# Patient Record
Sex: FEMALE | Race: WHITE
[De-identification: names, ages, dates, MRNs, and addresses within clinical notes are randomized per-mention and may not be internally consistent; named-entity substitution may affect disease eponyms.]

---

## 2019-02-04 ENCOUNTER — HOSPITAL ENCOUNTER (EMERGENCY)
Dept: HOSPITAL 31 - C.ER | Age: 24
Discharge: HOME | End: 2019-02-04
Payer: COMMERCIAL

## 2019-02-04 VITALS
SYSTOLIC BLOOD PRESSURE: 118 MMHG | RESPIRATION RATE: 20 BRPM | DIASTOLIC BLOOD PRESSURE: 82 MMHG | TEMPERATURE: 98.9 F | OXYGEN SATURATION: 100 % | HEART RATE: 84 BPM

## 2019-02-04 DIAGNOSIS — R11.2: Primary | ICD-10-CM

## 2019-02-04 DIAGNOSIS — R19.7: ICD-10-CM

## 2019-02-04 LAB
ALBUMIN SERPL-MCNC: 5.2 G/DL (ref 3.5–5)
ALBUMIN/GLOB SERPL: 1.7 {RATIO} (ref 1–2.1)
ALT SERPL-CCNC: 21 U/L (ref 9–52)
AST SERPL-CCNC: 49 U/L (ref 14–36)
BACTERIA #/AREA URNS HPF: (no result) /[HPF]
BASOPHILS # BLD AUTO: 0 K/UL (ref 0–0.2)
BASOPHILS NFR BLD: 0.2 % (ref 0–2)
BILIRUB UR-MCNC: NEGATIVE MG/DL
BUN SERPL-MCNC: 10 MG/DL (ref 7–17)
CALCIUM SERPL-MCNC: 9.7 MG/DL (ref 8.6–10.4)
EOSINOPHIL # BLD AUTO: 0 K/UL (ref 0–0.7)
EOSINOPHIL NFR BLD: 0.1 % (ref 0–4)
ERYTHROCYTE [DISTWIDTH] IN BLOOD BY AUTOMATED COUNT: 12.9 % (ref 11.5–14.5)
GFR NON-AFRICAN AMERICAN: > 60
GLUCOSE UR STRIP-MCNC: NORMAL MG/DL
HCG,QUALITATIVE URINE: NEGATIVE
HGB BLD-MCNC: 12.5 G/DL (ref 11–16)
LEUKOCYTE ESTERASE UR-ACNC: (no result) LEU/UL
LIPASE: 75 U/L (ref 23–300)
LYMPHOCYTES # BLD AUTO: 0.9 K/UL (ref 1–4.3)
LYMPHOCYTES NFR BLD AUTO: 5.6 % (ref 20–40)
MCH RBC QN AUTO: 31.3 PG (ref 27–31)
MCHC RBC AUTO-ENTMCNC: 33.9 G/DL (ref 33–37)
MCV RBC AUTO: 92.3 FL (ref 81–99)
MONOCYTES # BLD: 0.3 K/UL (ref 0–0.8)
MONOCYTES NFR BLD: 2 % (ref 0–10)
NEUTROPHILS # BLD: 14.5 K/UL (ref 1.8–7)
NEUTROPHILS NFR BLD AUTO: 92.1 % (ref 50–75)
NRBC BLD AUTO-RTO: 0 % (ref 0–2)
PH UR STRIP: 6 [PH] (ref 5–8)
PLATELET # BLD: 247 K/UL (ref 130–400)
PMV BLD AUTO: 8.5 FL (ref 7.2–11.7)
PROT UR STRIP-MCNC: (no result) MG/DL
RBC # BLD AUTO: 3.98 MIL/UL (ref 3.8–5.2)
RBC # UR STRIP: NEGATIVE /UL
SP GR UR STRIP: 1.03 (ref 1–1.03)
SQUAMOUS EPITHIAL: 1 /HPF (ref 0–5)
UROBILINOGEN UR-MCNC: NORMAL MG/DL (ref 0.2–1)
WBC # BLD AUTO: 15.8 K/UL (ref 4.8–10.8)

## 2019-02-04 PROCEDURE — 80053 COMPREHEN METABOLIC PANEL: CPT

## 2019-02-04 PROCEDURE — 84702 CHORIONIC GONADOTROPIN TEST: CPT

## 2019-02-04 PROCEDURE — 83690 ASSAY OF LIPASE: CPT

## 2019-02-04 PROCEDURE — 84703 CHORIONIC GONADOTROPIN ASSAY: CPT

## 2019-02-04 PROCEDURE — 99284 EMERGENCY DEPT VISIT MOD MDM: CPT

## 2019-02-04 PROCEDURE — 85025 COMPLETE CBC W/AUTO DIFF WBC: CPT

## 2019-02-04 PROCEDURE — 81001 URINALYSIS AUTO W/SCOPE: CPT

## 2019-02-04 PROCEDURE — 96375 TX/PRO/DX INJ NEW DRUG ADDON: CPT

## 2019-02-04 PROCEDURE — 96374 THER/PROPH/DIAG INJ IV PUSH: CPT

## 2019-02-04 NOTE — C.PDOC
History Of Present Illness


23 year old female presents to the ER with a complaint of vomiting, nausea, and 

diarrhea typical of her menstrual periods. Patient states she has been vomiting 

since last night, not tolerating fluids. Denies fever or chills.


Time Seen by Provider: 02/04/19 21:56


Chief Complaint (Nursing): Abdominal Pain


History Per: Patient


History/Exam Limitations: no limitations


Onset/Duration Of Symptoms: Hrs


Current Symptoms Are (Timing): Still Present


Associated Symptoms: Nausea, Vomiting, Diarrhea.  denies: Fever, Chills


Exacerbating Factors: None


Alleviating Factors: None


Recent travel outside of the [place]: No





Past Medical History


Reviewed: Historical Data, Nursing Documentation, Vital Signs


Vital Signs: 





                                Last Vital Signs











Temp  98.9 F   02/04/19 21:40


 


Pulse  84   02/04/19 21:40


 


Resp  20   02/04/19 21:40


 


BP  118/82   02/04/19 21:40


 


Pulse Ox  100   02/04/19 21:40











Family History: States: Unknown Family Hx





- Social History


Hx Alcohol Use: No


Hx Substance Use: No





- Immunization History


Hx Tetanus Toxoid Vaccination: No


Hx Influenza Vaccination: No


Hx Pneumococcal Vaccination: No





Review Of Systems


Constitutional: Negative for: Fever, Chills


Cardiovascular: Negative for: Chest Pain, Palpitations


Respiratory: Negative for: Cough, Shortness of Breath


Gastrointestinal: Positive for: Nausea, Vomiting, Diarrhea


Neurological: Negative for: Weakness, Numbness





Physical Exam





- Physical Exam


Appears: Non-toxic, Other (Thin white female, Vomiting)


Skin: Warm, Dry, Pale


Head: Atraumatic, Normacephalic


Eye(s): bilateral: Normal Inspection


Oral Mucosa: Moist


Neck: Normal, Supple


Chest: Symmetrical, No Tenderness


Cardiovascular: Rhythm Regular


Respiratory: Normal Breath Sounds, No Rales, No Rhonchi, No Wheezing


Gastrointestinal/Abdominal: Soft, No Tenderness


Back: No CVA Tenderness


Neurological/Psych: Oriented x3, Normal Speech





ED Course And Treatment





- Laboratory Results


Result Diagrams: 


                                 02/04/19 22:42





                                 02/04/19 22:42


Lab Results: 





                                        











Urine HCG, Qual  Negative  (NEGATIVE)   02/04/19  22:42    








                                        











Urine HCG, Qual  Negative  (NEGATIVE)   02/04/19  22:42    











Lab Interpretation: Abnormal (+ leukocytosis, prob due to vomiting)


Urine Pregnancy POC: Negative


O2 Sat by Pulse Oximetry: 100


Pulse Ox Interpretation: Normal


Reevaluation Time: 23:12


Reassessment Condition: Improved (mild residual nausea)





Medical Decision Making


Medical Decision Making: 





typical menstrual period n/v/d


improved with ED tx











Disposition


Doctor Will See Patient In The: Office


Counseled Patient/Family Regarding: Studies Performed, Diagnosis





- Disposition


Referrals: 


Axel Hill MD [Staff Provider] - 


Disposition: HOME/ ROUTINE


Disposition Time: 23:13


Condition: GOOD


Additional Instructions: 


zofran ODT 4 mg (nausea medication, dissolves in mouth) 


1 tab every 6-8 hours as needed for nausea/vomiting





Pepcid 20 mg twice a day (lowers stomach acid and settles stomach)


bland diet for 2 days


Prescriptions: 


Ondansetron ODT [Zofran ODT] 4 mg PO Q6H PRN #6 odt


 PRN Reason: Nausea/Vomiting


Instructions:  Diarrhea in Adolescents and Adults, Nausea and Vomiting, Adult 

(DC)


Forms:  [facility] (English)





- Clinical Impression


Clinical Impression: 


 Diarrhea, Vomiting








- Scribe Statement


The provider has reviewed the documentation as recorded by the Scribmina Hoff





All medical record entries made by the Scribe were at my direction and 

personally dictated by me. I have reviewed the chart and agree that the record 

accurately reflects my personal performance of the history, physical exam, 

medical decision making, and the department course for this patient. I have also

personally directed, reviewed, and agree with the discharge instructions and 

disposition.

## 2019-02-05 LAB
EOSINOPHIL NFR BLD: 2 % (ref 0–4)
LYMPHOCYTE: 3 % (ref 20–40)
MONOCYTE: 1 % (ref 0–10)
NEUTROPHILS NFR BLD AUTO: 94 % (ref 50–75)
PLATELET # BLD EST: NORMAL 10*3/UL
TOTAL CELLS COUNTED BLD: 100

## 2019-03-24 ENCOUNTER — HOSPITAL ENCOUNTER (EMERGENCY)
Dept: HOSPITAL 31 - C.ER | Age: 24
Discharge: HOME | End: 2019-03-24
Payer: COMMERCIAL

## 2019-03-24 VITALS
HEART RATE: 61 BPM | RESPIRATION RATE: 18 BRPM | SYSTOLIC BLOOD PRESSURE: 108 MMHG | TEMPERATURE: 98.8 F | DIASTOLIC BLOOD PRESSURE: 75 MMHG | OXYGEN SATURATION: 100 %

## 2019-03-24 DIAGNOSIS — S61.302A: Primary | ICD-10-CM
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
## 2019-03-24 NOTE — C.PDOC
History Of Present Illness





22 y/o female, otherwise well, comes in to ED after she injured her fingernail 

s/p fall earlier today. She states she had a long nail prosthesis on and broke 

the fall with the tip of her nail, ripping her original nail backwards. Patient 

reports of minimal bleeding with some pain and redness to the area, and rates 

pain 8/10 with movement. Nail is currently in place. 





Time Seen by Provider: 03/24/19 12:53


Chief Complaint (Nursing): Finger,Hand,&Wrist


History Per: Patient


History/Exam Limitations: no limitations


Onset/Duration Of Symptoms: Hrs


Current Symptoms Are (Timing): Still Present





Past Medical History


Reviewed: Historical Data, Nursing Documentation, Vital Signs


Vital Signs: 





                                Last Vital Signs











Temp  98.8 F   03/24/19 12:47


 


Pulse  61   03/24/19 12:47


 


Resp  18   03/24/19 12:47


 


BP  108/75   03/24/19 12:47


 


Pulse Ox  100   03/24/19 12:47














- Medical History


PMH: No Chronic Diseases


Family History: States: No Known Family Hx





- Social History


Hx Alcohol Use: No


Hx Substance Use: No





- Immunization History


Hx Tetanus Toxoid Vaccination: Yes


Hx Influenza Vaccination: Yes


Hx Pneumococcal Vaccination: No





Review Of Systems


Except As Marked, All Systems Reviewed And Found Negative.


Musculoskeletal: Positive for: Other (Pain to right 3rd digit).  Negative for: 

Neck Pain, Back Pain


Neurological: Negative for: Weakness, Numbness





Physical Exam





- Physical Exam


Appears: Non-toxic, No Acute Distress


Skin: Warm, Dry


Head: Atraumatic, Normacephalic


Eye(s): bilateral: Normal Inspection


Extremity: No Swelling, Other (Right 3rd digit has some blood in cuticle region;

nail is loose but in place and secured)


Extremity: Bilateral: Normal ROM


Pulses: Right Radial: Normal


Neurological/Psych: Oriented x3, Normal Speech, Normal Motor, Normal Sensation





ED Course And Treatment


O2 Sat by Pulse Oximetry: 100 (RA)


Pulse Ox Interpretation: Normal





Medical Decision Making


Medical Decision Making: 





Plan:


--Motrin 600 mg PO


--Tylenol PO 





Patient instructed to follow up with PMD in 1-2 days for further evaluation. 








Disposition


Counseled Patient/Family Regarding: Diagnosis, Need For Followup, Rx Given





- Disposition


Disposition: HOME/ ROUTINE


Disposition Time: 13:21


Condition: STABLE


Prescriptions: 


Ibuprofen [Motrin] 600 mg PO TID #15 tab


Instructions:  Nail Avulsion (DC)


Forms:  [facility] (English), General Discharge Instructions





- POA


Present On Arrival: None





- Clinical Impression


Clinical Impression: 


 Nail avulsion








- Scribe Statement


The provider has reviewed the documentation as recorded by the Alexibmina Lares





Provider Attestation: 





All medical record entries made by the Alexibe were at my direction and 

personally dictated by me. I have reviewed the chart and agree that the record 

accurately reflects my personal performance of the history, physical exam, 

medical decision making, and the department course for this patient. I have also

 personally directed, reviewed, and agree with the discharge instructions and 

disposition.